# Patient Record
Sex: FEMALE | Race: OTHER | Employment: FULL TIME | URBAN - METROPOLITAN AREA
[De-identification: names, ages, dates, MRNs, and addresses within clinical notes are randomized per-mention and may not be internally consistent; named-entity substitution may affect disease eponyms.]

---

## 2017-03-17 PROBLEM — J44.9 CHRONIC OBSTRUCTIVE PULMONARY DISEASE (HCC): Status: ACTIVE | Noted: 2017-03-17

## 2018-10-17 ENCOUNTER — HOSPITAL ENCOUNTER (OUTPATIENT)
Dept: MAMMOGRAPHY | Age: 53
Discharge: HOME | End: 2018-10-17
Attending: OBSTETRICS & GYNECOLOGY
Payer: MEDICAID

## 2018-10-17 DIAGNOSIS — Z00.00 ROUTINE ADULT HEALTH MAINTENANCE: ICD-10-CM

## 2018-10-17 PROCEDURE — 77067 SCR MAMMO BI INCL CAD: CPT

## 2018-10-24 PROBLEM — F17.219 CIGARETTE NICOTINE DEPENDENCE WITH NICOTINE-INDUCED DISORDER: Status: ACTIVE | Noted: 2018-10-24

## 2021-07-12 ENCOUNTER — HOSPITAL ENCOUNTER (OUTPATIENT)
Dept: GENERAL RADIOLOGY | Age: 56
Discharge: HOME | End: 2021-07-12
Payer: MEDICAID

## 2021-07-12 DIAGNOSIS — J44.9 CHRONIC OBSTRUCTIVE PULMONARY DISEASE, UNSPECIFIED COPD TYPE (HCC): ICD-10-CM

## 2021-07-12 DIAGNOSIS — Z09 HOSPITAL DISCHARGE FOLLOW-UP: ICD-10-CM

## 2021-07-12 PROCEDURE — 71046 X-RAY EXAM CHEST 2 VIEWS: CPT

## 2022-03-18 PROBLEM — F17.219 CIGARETTE NICOTINE DEPENDENCE WITH NICOTINE-INDUCED DISORDER: Status: ACTIVE | Noted: 2018-10-24

## 2023-02-16 DIAGNOSIS — J44.9 CHRONIC OBSTRUCTIVE PULMONARY DISEASE, UNSPECIFIED COPD TYPE (HCC): Primary | ICD-10-CM

## 2023-03-03 NOTE — PROGRESS NOTES
She is a 61-year-old with COPD who is seen today at the request of her primary provider. Last seen by me 7/2021 in hospital follow-up due to COPD exacerbation. At that time, it was recommended that she continue Stiolto, Pulmicort inhaler, as needed albuterol inhaler or levalbuterol via nebulizer. Follow-up was recommended in 6 months. Unfortunately, she no showed for appointment. Recent note by primary provider reviewed. Note that inhalers were switched due to insurance coverage, placed on Incruse and Symbicort as Stiolto was no longer covered. It was recommended that she follow-up with us. DIAGNOSTICS:        CXR:   Outside chest x-ray dated 1/16/2016 - reviewed with Dr. Shivani Kapoor, no definite acute cardiopulmonary process. Spirometry - 2/2/2016 - normal.  Spirometry 9/15/2016 - normal, Interval decline  Spirometry 3/17/2017 -normal, borderline mild obstructive defect. Significant improvement in FVC, slight improvement in FEV1. CXR 10/19/2018 - [facility] - mild infiltrate seen bilaterally. May be due to mild interstitial edema vs atypical pneumonia. CXR 11/7/2018-[facility]- no acute process. CXR 6/19/20212355-Tfrrkz-oz evidence of acute cardiopulmonary abnormality. Echo 6/20/2021-EF greater than 65%, hyperdynamic. RVSP estimated at 34 mmHg. CXR 7/12/2021-preliminary review-no acute cardiopulmonary process.

## 2023-03-06 ENCOUNTER — HOSPITAL ENCOUNTER (OUTPATIENT)
Dept: GENERAL RADIOLOGY | Age: 58
Discharge: HOME | End: 2023-03-09
Payer: MEDICAID

## 2023-03-06 ENCOUNTER — OFFICE VISIT (OUTPATIENT)
Dept: PULMONOLOGY | Age: 58
End: 2023-03-06
Payer: MEDICAID

## 2023-03-06 VITALS
SYSTOLIC BLOOD PRESSURE: 132 MMHG | BODY MASS INDEX: 21.44 KG/M2 | HEIGHT: 63 IN | OXYGEN SATURATION: 99 % | DIASTOLIC BLOOD PRESSURE: 78 MMHG | TEMPERATURE: 97.2 F | WEIGHT: 121 LBS | HEART RATE: 70 BPM

## 2023-03-06 DIAGNOSIS — J44.9 CHRONIC OBSTRUCTIVE PULMONARY DISEASE, UNSPECIFIED COPD TYPE (HCC): ICD-10-CM

## 2023-03-06 DIAGNOSIS — J30.2 OTHER SEASONAL ALLERGIC RHINITIS: ICD-10-CM

## 2023-03-06 DIAGNOSIS — J44.9 STAGE 1 MILD COPD BY GOLD CLASSIFICATION (HCC): Primary | ICD-10-CM

## 2023-03-06 DIAGNOSIS — F17.219 CIGARETTE NICOTINE DEPENDENCE WITH NICOTINE-INDUCED DISORDER: ICD-10-CM

## 2023-03-06 DIAGNOSIS — Z80.1 FAMILY HISTORY OF LUNG CANCER: ICD-10-CM

## 2023-03-06 LAB
EXPIRATORY TIME: NORMAL
FEF 25-75% %PRED-PRE: NORMAL
FEF 25-75% PRED: NORMAL
FEF 25-75%-PRE: NORMAL
FEV1 %PRED-PRE: 78 %
FEV1 PRED: NORMAL
FEV1/FVC %PRED-PRE: NORMAL
FEV1/FVC PRED: NORMAL
FEV1/FVC: 75 %
FEV1: 2 L
FVC %PRED-PRE: 82 %
FVC PRED: NORMAL
FVC: 2.65 L
PEF %PRED-PRE: NORMAL
PEF PRED: NORMAL
PEF-PRE: NORMAL

## 2023-03-06 PROCEDURE — 94010 BREATHING CAPACITY TEST: CPT | Performed by: INTERNAL MEDICINE

## 2023-03-06 PROCEDURE — 71046 X-RAY EXAM CHEST 2 VIEWS: CPT

## 2023-03-06 PROCEDURE — 99214 OFFICE O/P EST MOD 30 MIN: CPT | Performed by: NURSE PRACTITIONER

## 2023-03-06 PROCEDURE — G0296 VISIT TO DETERM LDCT ELIG: HCPCS | Performed by: NURSE PRACTITIONER

## 2023-03-06 RX ORDER — VENLAFAXINE HYDROCHLORIDE 150 MG/1
CAPSULE, EXTENDED RELEASE ORAL
COMMUNITY
Start: 2023-02-27

## 2023-03-06 RX ORDER — BUDESONIDE AND FORMOTEROL FUMARATE DIHYDRATE 80; 4.5 UG/1; UG/1
AEROSOL RESPIRATORY (INHALATION)
COMMUNITY
Start: 2023-01-30

## 2023-03-06 RX ORDER — LORATADINE 10 MG/1
TABLET ORAL
COMMUNITY
Start: 2023-02-27

## 2023-03-06 RX ORDER — UMECLIDINIUM 62.5 UG/1
AEROSOL, POWDER ORAL
COMMUNITY
Start: 2023-01-30

## 2023-03-06 RX ORDER — ROSUVASTATIN CALCIUM 20 MG/1
TABLET, COATED ORAL
COMMUNITY
Start: 2023-02-27

## 2023-03-06 RX ORDER — ALBUTEROL SULFATE 2.5 MG/3ML
2.5 SOLUTION RESPIRATORY (INHALATION) EVERY 6 HOURS PRN
COMMUNITY

## 2023-03-06 RX ORDER — LOSARTAN POTASSIUM 50 MG/1
TABLET ORAL
COMMUNITY
Start: 2023-02-27

## 2023-03-06 RX ORDER — ASPIRIN 81 MG/1
81 TABLET ORAL DAILY
COMMUNITY

## 2023-03-06 RX ORDER — TRAZODONE HYDROCHLORIDE 50 MG/1
TABLET ORAL
COMMUNITY
Start: 2023-02-27

## 2023-03-06 ASSESSMENT — PULMONARY FUNCTION TESTS
FEV1_PERCENT_PREDICTED_PRE: 78
FEV1/FVC: 75
FEV1: 2.00
FVC_PERCENT_PREDICTED_PRE: 82
FVC: 2.65

## 2023-03-06 ASSESSMENT — ENCOUNTER SYMPTOMS
SPUTUM PRODUCTION: 0
COUGH: 0
HEMOPTYSIS: 0
SHORTNESS OF BREATH: 0
WHEEZING: 1
SNORING: 1

## 2023-03-06 NOTE — PATIENT INSTRUCTIONS
Continue Incruse 1 inhalation daily. Complete current supply of Pulmicort, 1 inhalation twice daily. Then begin Symbicort, 2 puffs twice daily, rinse mouth after use. Albuterol inhaler or nebulizer 4 times daily as needed. Commended in remaining tobacco free. Meets criteria for screening CT now that she is age 56. She is agreeable. I will call her with results of screening CT. If unremarkable, anticipate that she would need additional screening CT 1 year from that date and will arrange follow-up at that timeframe with spirometry. Prescription for Prevnar 20 is provided. She is up-to-date on COVID, flu vaccines.

## 2023-03-06 NOTE — PROGRESS NOTES
[address] [facility] [place]. [address], [address] [facility]  [phone]    Patient Name:  Kristopher Esposito    YOB: 1965    Office Visit 3/6/2023      CHIEF COMPLAINT:      Chief Complaint   Patient presents with    COPD    Follow-up         ASSESSMENT:   (Medical Decision Making)                                                                                                                                          Encounter Diagnoses   Name Primary? Stage 1 mild COPD by GOLD classification ([address] Yes    Other seasonal allergic rhinitis     Cigarette nicotine dependence with nicotine-induced disorder     Family history of lung cancer      She is stable symptomatically. Spirometry is within normal limits, with some decline. Medications have been changed recently but in essence, on triple therapy. Allergy symptoms are currently controlled with Singulair. She remains tobacco free. She now meets age criteria for screening CT. Clarified tobacco history, has accumulated 42 pack years. Discussed current guidelines for screening for lung cancer, annual screening LDCT from age 50-69 or until tobacco free for 15 years. Pack-year history -42  Tobacco status - quit in 2018. Discussed risk and benefits of screening, currently exhibits no signs and symptoms suggestive of lung cancer. Discussed importance of compliance with annual lung cancer screenings and willingness to undergo diagnosis and treatment if screening scan is positive. In addition, the patient was counseled regarding remaining tobacco free/total smoking cessation. Reports strong family hx of cancer, including lung cancer. PLAN:     Continue Incruse 1 inhalation daily. Complete current supply of Pulmicort, 1 inhalation twice daily. Then begin Symbicort, 2 puffs twice daily, rinse mouth after use. Albuterol inhaler or nebulizer 4 times daily as needed.     Commended in remaining tobacco free. Meets criteria for screening CT now that she is age 56. She is agreeable. I will call her with results of screening CT. If unremarkable, anticipate that she would need additional screening CT 1 year from that date and will arrange follow-up at that timeframe with spirometry. Prescription for Prevnar 20 is provided. She is up-to-date on COVID, flu vaccines. Orders Placed This Encounter   Procedures    CT LUNG SCREENING    Spirometry Without Bronchodilator    MD VISIT TO DISCUSS LUNG CA SCREEN W LDCT           Follow-up and Dispositions    Return in about 62 weeks (around 4/15/2024) for MD or [facility], 40  min appt, COPD, PFT's, LDCT prior to appt. Cate Alvarenga, APRN - CNP    Total  time spent with patient - 38 min. Collaborating MD: Dr. Mehta Reason:    She is a 82-year-old with COPD who is seen today at the request of her primary provider. Last seen by me 7/2021 in hospital follow-up due to COPD exacerbation. At that time, it was recommended that she continue Stiolto, Pulmicort inhaler, as needed albuterol inhaler or levalbuterol via nebulizer. Follow-up was recommended in 6 months. Unfortunately, she no showed for appointment. Recent note by primary provider reviewed. Note that inhalers were switched due to insurance coverage, placed on Incruse and Symbicort as Stiolto was no longer covered. It was recommended that she follow-up with us. Today, she reports overall stability in her respiratory status. She denies any significant shortness of breath. Has minimal wheezing, she associates this with change in weather. Uses nebulizer and perceives good response. Averages 1 time per week. There is been interval decrease in cough. She denies hemoptysis. States that she still has supply of Pulmicort that she would like to use up but appears to have some confusion and thinks that she is not supposed to take Pulmicort along with Incruse. Clarified appropriate use of prescribed MDIs and in essence, is getting triple therapy. She remains tobacco free. DIAGNOSTICS:        CXR:   Outside chest x-ray dated 1/16/2016 - reviewed with Dr. Eliud Berrios, no definite acute cardiopulmonary process. Spirometry - 2/2/2016 - normal.  Spirometry 9/15/2016 - normal, Interval decline  Spirometry 3/17/2017 -normal, borderline mild obstructive defect. Significant improvement in FVC, slight improvement in FEV1. CXR 10/19/2018 - [facility] - mild infiltrate seen bilaterally. May be due to mild interstitial edema vs atypical pneumonia. CXR 11/7/2018-[facility]- no acute process. CXR 6/19/[id number]-Erysyj-fd evidence of acute cardiopulmonary abnormality. Echo 6/20/2021-EF greater than 65%, hyperdynamic. RVSP estimated at 34 mmHg. CXR 7/12/2021-preliminary review-no acute cardiopulmonary process. CXR 3/6/2023-clear lungs. Spirometry 3/6/2023-normal.  Interval decline in FVC. No significant change in FEV1.    _____________________________________________________________      REVIEW OF SYSTEMS:    Review of Systems   Constitutional: Positive for weight loss. Negative for chills, fever and malaise/fatigue. Respiratory:  Positive for snoring and wheezing. Negative for cough, hemoptysis, shortness of breath and sputum production. Reports snoring but denies witnessed apneas, daytime hypersomnolence or excessive fatigue. PHYSICAL EXAM:    Vitals:    03/06/23 0901   BP: 132/78   Pulse: 70   Temp: 97.2 °F (36.2 °C)   TempSrc: Skin   SpO2: 99%   Weight: 121 lb (54.9 kg)   Height: 5' 3\" (1.6 m)        Body mass index is 21.43 kg/m². GENERAL APPEARANCE:  The patient is normal weight and in no respiratory distress. HEENT:  PERRL. Conjunctivae unremarkable. NECK/LYMPHATIC:   Symmetrical with no elevation of jugular venous pulsation. Trachea midline. LUNGS:   Normal respiratory effort with symmetrical lung expansion.    Breath sounds - decreased but clear. HEART:   There is a normal rate and regular rhythm. No murmur, rub, or gallop. There is no edema in the lower extremities. NEURO:  The patient is alert and oriented to person, place, and time. Memory appears intact and mood is normal.  No gross sensorimotor deficits are present. DIAGNOSTIC TESTS: Studies were personally reviewed by me and discussed with the patient. Spirometry:    3/2017: Today:    Office Spirometry Results Latest Ref Rng & Units 3/6/2023   FVC L 2.65   FEV1 L 2.00   FEV1 %PRED-PRE % 78   FVC %PRED-PRE % 82   FEV1/FVC % 75           CXR:      XR CHEST (2 VW) 2023    Narrative  XR CHEST (2 VW) 3/6/2023 8:10 AM    HISTORY: COPD. COMPARISON: Chest x-ray 2021. AP and lateral views of the chest were obtained. Impression  The lungs are clear. The heart size is normal in size. No  pneumothorax. No pleural effusions. Past Medical History:   Diagnosis Date    Chicken pox Childhood    COPD exacerbation ([address] 2016     Hospitalized at [facility]    H/O fibrocystic disease of breast        Patient Active Problem List   Diagnosis    Cigarette nicotine dependence with nicotine-induced disorder    Other seasonal allergic rhinitis    Tobacco use    Chronic obstructive pulmonary disease ([address]       Past Surgical History:   Procedure Laterality Date    DILATION AND CURETTAGE OF UTERUS      with hysteroscopy    MICHELLE AND BSO (CERVIX REMOVED)           Social History     Socioeconomic History    Marital status:      Spouse name: None    Number of children: None    Years of education: None    Highest education level: None   Tobacco Use    Smoking status: Former     Packs/day: 1.50     Years: 28.00     Pack years: 42.00     Types: Cigarettes     Quit date: 2018     Years since quittin.3    Smokeless tobacco: Never   Substance and Sexual Activity    Alcohol use: No     Alcohol/week: 0.0 standard drinks    Drug use:  No Social History Narrative    There is no known exposure to TB. There is no significant environmental or industrial exposure.          Family History   Problem Relation Age of Onset    Hypertension Mother     Asthma Mother     Diabetes Father     Hypertension Father     Heart Disease Father     Hypertension Maternal Aunt     Hypertension Maternal Uncle     Breast Cancer Paternal Aunt 28    Diabetes Paternal Aunt     Hypertension Paternal Aunt     Cancer Paternal Aunt          bone    Diabetes Paternal Uncle     Hypertension Paternal Uncle     Breast Cancer Paternal Grandmother         unsure of age    Lung Cancer Paternal Grandmother     Ovarian Cancer Neg Hx     Colon Cancer Neg Hx        Allergies   Allergen Reactions    Aspirin Other (See Comments)     \"ears ringing\"    Codeine Nausea And Vomiting       Current Outpatient Medications   Medication Sig    INCRUSE ELLIPTA 62.5 MCG/ACT inhaler     budesonide-formoterol (SYMBICORT) 80-4.5 MCG/ACT AERO     albuterol (PROVENTIL) (2.5 MG/3ML) 0.083% nebulizer solution Take 2.5 mg by nebulization every 6 hours as needed    metoprolol tartrate (LOPRESSOR) 25 MG tablet     rosuvastatin (CRESTOR) 20 MG tablet     traZODone (DESYREL) 50 MG tablet     venlafaxine (EFFEXOR XR) 150 MG extended release capsule     loratadine (CLARITIN) 10 MG tablet     losartan (COZAAR) 50 MG tablet     aspirin 81 MG EC tablet Take 81 mg by mouth daily    albuterol sulfate  (90 Base) MCG/ACT inhaler 2 puffs qid prn    fluticasone (FLONASE) 50 MCG/ACT nasal spray 2 sprays by Nasal route daily    ibuprofen (ADVIL;MOTRIN) 200 MG CAPS Take by mouth as needed    metFORMIN (GLUCOPHAGE) 1000 MG tablet Take 1,000 mg by mouth 2 times daily (with meals)    montelukast (SINGULAIR) 10 MG tablet Take 10 mg by mouth    Naproxen Sodium 220 MG CAPS Take by mouth    tiotropium-olodaterol (STIOLTO) 2.5-2.5 MCG/ACT AERS Inhale 2 puffs into the lungs daily (Patient not taking: Reported on 3/6/2023) venlafaxine (EFFEXOR XR) 75 MG extended release capsule Take 75 mg by mouth daily (Patient not taking: Reported on 3/6/2023)     No current facility-administered medications for this visit. Over 50% of today's office visit was spent in face to face time reviewing test results, prognosis, importance of compliance, education about disease process, benefits of medications, instructions for management of acute symptoms, and follow up plans. Electronically signed. Dictated using voice recognition software.   Proof read but unrecognized errors may exist.

## 2023-04-06 ENCOUNTER — HOSPITAL ENCOUNTER (OUTPATIENT)
Dept: CT IMAGING | Age: 58
Discharge: HOME | End: 2023-04-06
Payer: MEDICAID

## 2023-04-06 VITALS — BODY MASS INDEX: 23.56 KG/M2 | HEIGHT: 60 IN | WEIGHT: 120 LBS

## 2023-04-06 DIAGNOSIS — Z80.1 FAMILY HISTORY OF LUNG CANCER: ICD-10-CM

## 2023-04-06 DIAGNOSIS — F17.219 CIGARETTE NICOTINE DEPENDENCE WITH NICOTINE-INDUCED DISORDER: ICD-10-CM

## 2023-04-06 PROCEDURE — 71271 CT THORAX LUNG CANCER SCR C-: CPT

## 2025-04-15 ENCOUNTER — HOSPITAL ENCOUNTER (OUTPATIENT)
Dept: CT IMAGING | Age: 60
Discharge: HOME | End: 2025-04-17
Payer: MEDICARE

## 2025-04-15 DIAGNOSIS — F17.200 TOBACCO USE DISORDER: ICD-10-CM

## 2025-04-15 PROCEDURE — 71271 CT THORAX LUNG CANCER SCR C-: CPT
